# Patient Record
Sex: MALE | Race: WHITE | NOT HISPANIC OR LATINO | Employment: OTHER | URBAN - NONMETROPOLITAN AREA
[De-identification: names, ages, dates, MRNs, and addresses within clinical notes are randomized per-mention and may not be internally consistent; named-entity substitution may affect disease eponyms.]

---

## 2017-07-18 ENCOUNTER — OFFICE VISIT (OUTPATIENT)
Dept: OPHTHALMOLOGY | Facility: CLINIC | Age: 37
End: 2017-07-18

## 2017-07-18 DIAGNOSIS — Q11.2 MICROPHTHALMIA OF BOTH EYES: ICD-10-CM

## 2017-07-18 DIAGNOSIS — H01.002 BLEPHARITIS OF RIGHT LOWER EYELID, UNSPECIFIED TYPE: Primary | ICD-10-CM

## 2017-07-18 PROCEDURE — 99213 OFFICE O/P EST LOW 20 MIN: CPT | Performed by: OPHTHALMOLOGY

## 2017-07-18 NOTE — PROGRESS NOTES
Subjective   Tariq Mcfarlane is a 36 y.o. male.   Chief Complaint   Patient presents with   • Eye Exam   • bilateral microphthalmia with blindness     HPI     Eye Exam   Laterality: both eyes           Comments   [place] resident, no reported problems, hx of microphalmia       Last edited by Bill Voss MD on 7/18/2017  3:57 PM. (History)          Review of Systems    Objective   Base Eye Exam     Visual Acuity (Snellen - Linear)      Right Left   Dist sc NLP NLP       Unable to obtain visual acuity              Slit Lamp and Fundus Exam     External Exam      Right Left    External Normal Normal      Slit Lamp Exam      Right Left    Lids/Lashes erythema, scabbed lesion RLL Normal    Conjunctiva/Sclera White and quiet White and quiet    Cornea microphthalmia, Opacity: Central, Peripheral Opacity: Central, Peripheral microphthalmia    Anterior Chamber  Deep and quiet    Iris  Round and reactive    Lens  Clear    Vitreous  Normal                Assessment/Plan   Diagnoses and all orders for this visit:    Blepharitis of right lower eyelid, unspecified type    Microphthalmia of both eyes  Comments:  blindness        Plan:   rec Erythromycin to RLL tid for 10 days

## 2017-08-29 ENCOUNTER — OFFICE VISIT (OUTPATIENT)
Dept: OTOLARYNGOLOGY | Facility: CLINIC | Age: 37
End: 2017-08-29

## 2017-08-29 VITALS — BODY MASS INDEX: 24.35 KG/M2 | WEIGHT: 124 LBS | HEIGHT: 60 IN | TEMPERATURE: 98 F

## 2017-08-29 DIAGNOSIS — H62.40 ACUTE FUNGAL OTITIS EXTERNA: Primary | ICD-10-CM

## 2017-08-29 DIAGNOSIS — B36.9 ACUTE FUNGAL OTITIS EXTERNA: Primary | ICD-10-CM

## 2017-08-29 PROCEDURE — 99202 OFFICE O/P NEW SF 15 MIN: CPT | Performed by: OTOLARYNGOLOGY

## 2017-08-29 RX ORDER — NEOMYCIN SULFATE, POLYMYXIN B SULFATE, AND DEXAMETHASONE 3.5; 10000; 1 MG/G; [USP'U]/G; MG/G
OINTMENT OPHTHALMIC
COMMUNITY
Start: 2017-08-25 | End: 2017-10-18

## 2017-09-28 ENCOUNTER — OFFICE VISIT (OUTPATIENT)
Dept: OTOLARYNGOLOGY | Facility: CLINIC | Age: 37
End: 2017-09-28

## 2017-09-28 VITALS — OXYGEN SATURATION: 97 % | BODY MASS INDEX: 24.35 KG/M2 | HEIGHT: 60 IN | WEIGHT: 124 LBS

## 2017-09-28 DIAGNOSIS — H66.011 ACUTE SUPPR OTITIS MEDIA W SPON RUPT EAR DRUM, RIGHT EAR: Primary | ICD-10-CM

## 2017-09-28 PROCEDURE — 99213 OFFICE O/P EST LOW 20 MIN: CPT | Performed by: OTOLARYNGOLOGY

## 2017-09-28 PROCEDURE — 92504 EAR MICROSCOPY EXAMINATION: CPT | Performed by: OTOLARYNGOLOGY

## 2017-10-01 NOTE — PROGRESS NOTES
Subjective   Tariq Mcfarlane is a 36 y.o. male.       History of Present Illness   Patient is a relative of Ghassan.  Had been previously treated for right-sided otorrhea with antibiotics.  I saw him on 8/29/17 and felt that the discharge in his ear was likely fungal and changed him to clotrimazole.  He returns today for reevaluation because of persistence of otorrhea.  This history is obtained from the chart.  History is otherwise unobtainable due to mental status.      The following portions of the patient's history were reviewed and updated as appropriate: allergies, current medications, past family history, past medical history, past social history, past surgical history and problem list.     reports that he has never smoked. He does not have any smokeless tobacco history on file.   Patient is not a tobacco user and has not been counseled for use of tobacco products      Review of Systems   Unable to perform ROS: Patient nonverbal           Objective   Physical Exam right ear canal shows what today appears to be mucopurulent discharge.  He did cooperate with evacuation under the microscope.  Today the tympanic membrane appears inflamed and bulging consistent with acute otitis media with rupture.    Assessment/Plan   Tariq was seen today for follow-up.    Diagnoses and all orders for this visit:    Acute suppr otitis media w spon rupt ear drum, right ear        Plan: Discontinue clotrimazole.  Trial Augmentin 875 mg twice a day along with topical gentamicin ophthalmic to the right ear twice a day.  Reevaluate in 2 weeks.

## 2017-10-17 ENCOUNTER — OFFICE VISIT (OUTPATIENT)
Dept: OTOLARYNGOLOGY | Facility: CLINIC | Age: 37
End: 2017-10-17

## 2017-10-17 VITALS — HEIGHT: 60 IN | WEIGHT: 124 LBS | BODY MASS INDEX: 24.35 KG/M2 | RESPIRATION RATE: 17 BRPM

## 2017-10-17 DIAGNOSIS — Z86.69 OTITIS MEDIA RESOLVED: Primary | ICD-10-CM

## 2017-10-17 PROCEDURE — 99212 OFFICE O/P EST SF 10 MIN: CPT | Performed by: OTOLARYNGOLOGY

## 2017-10-18 NOTE — PROGRESS NOTES
Subjective   Tariq Mcfarlane is a 36 y.o. male.       History of Present Illness   Patient was seen previously with right-sided otorrhea.  This was initially thought to be fungal but it did not respond clotrimazole and on more recent visit appear to be consistent with an acute otitis media with rupture.  Patient was treated with Augmentin and topical gentamicin ophthalmic.      The following portions of the patient's history were reviewed and updated as appropriate: allergies, current medications, past family history, past medical history, past social history, past surgical history and problem list.     reports that he has never smoked. He does not have any smokeless tobacco history on file.   Patient is not a tobacco user and has not been counseled for use of tobacco products      Review of Systems        Objective   Physical Exam  Right ear canal shows no discharge.  Tympanic membrane appears to be intact with no infection or effusion.  Left ear shows some partially occluding wax but no evidence of infection      Assessment/Plan   Tariq was seen today for follow-up.    Diagnoses and all orders for this visit:    Otitis media resolved      Plan: Discontinue eardrops and follow up with me as needed

## 2019-11-14 DIAGNOSIS — K08.409 HISTORY OF TOOTH EXTRACTION, UNSPECIFIED EDENTULISM CLASS: Primary | ICD-10-CM

## 2019-11-14 RX ORDER — HYDROCODONE BITARTRATE AND ACETAMINOPHEN 7.5; 325 MG/1; MG/1
1 TABLET ORAL EVERY 4 HOURS PRN
Qty: 12 TABLET | Refills: 0 | Status: SHIPPED | OUTPATIENT
Start: 2019-11-14

## 2021-09-07 DIAGNOSIS — R51.9 ACUTE INTRACTABLE HEADACHE, UNSPECIFIED HEADACHE TYPE: Primary | ICD-10-CM

## 2021-09-07 RX ORDER — TRAMADOL HYDROCHLORIDE 50 MG/1
50 TABLET ORAL ONCE
Qty: 1 TABLET | Refills: 0 | Status: SHIPPED | OUTPATIENT
Start: 2021-09-07 | End: 2021-09-07